# Patient Record
Sex: FEMALE | URBAN - METROPOLITAN AREA
[De-identification: names, ages, dates, MRNs, and addresses within clinical notes are randomized per-mention and may not be internally consistent; named-entity substitution may affect disease eponyms.]

---
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
## 2024-11-13 NOTE — ED PROVIDER NOTE - NSTIMEPROVIDERCAREINITIATE_GEN_ER
Discharge Planning Assessment   Chris     Patient Name: Camelia Gray  MRN: [id number]  Today's Date: 11/13/2024    Admit Date: 11/12/2024    Plan: Return home alone with intermittent caregivers. No home oxygen-currently on 3 liters   Discharge Needs Assessment       Row Name 11/13/24 1306       Living Environment    People in Home alone    Unique Family Situation has daily caregivers but not 24 hr    Current Living Arrangements home    Potentially Unsafe Housing Conditions none    In the past 12 months has the electric, gas, oil, or water company threatened to shut off services in your home? No    Primary Care Provided by self    Provides Primary Care For no one    Family Caregiver if Needed none    Quality of Family Relationships supportive    Able to Return to Prior Arrangements yes    Living Arrangement Comments nephew helps if needed       Resource/Environmental Concerns    Resource/Environmental Concerns none    Transportation Concerns none       Transportation Needs    In the past 12 months, has lack of transportation kept you from medical appointments or from getting medications? no    In the past 12 months, has lack of transportation kept you from meetings, work, or from getting things needed for daily living? No       Food Insecurity    Within the past 12 months, you worried that your food would run out before you got the money to buy more. Never true    Within the past 12 months, the food you bought just didn't last and you didn't have money to get more. Never true       Transition Planning    Patient/Family Anticipates Transition to home;home with help/services    Patient/Family Anticipated Services at Transition none    Transportation Anticipated family or friend will provide       Discharge Needs Assessment    Readmission Within the Last 30 Days no previous admission in last 30 days    Equipment Currently Used at Home walker, rolling;wheelchair    Concerns to be Addressed discharge planning     Anticipated Changes Related to Illness none    Equipment Needed After Discharge none                   Discharge Plan       Row Name 11/13/24 1257       Plan    Plan Return home alone with intermittent caregivers. No home oxygen-currently on 3 liters    Patient/Family in Agreement with Plan yes    Plan Comments Barriers: Cardiology consult and pending heart Echo. CM met with Ms. Gray who is a/o and said she lives alone, does not drive and has hired caregivers daily that help with home duties and transportation. She  has a wheelchair, and rolling walker.  She does not have home oxygen and is on 3 liters at this time. She denies need for continued PT and nursing after discharge and has been at a SNF multiple times, usually [facility].  She denied any financial concerns. She said one of her caregivers or her nephew will provide transportation home at discharge                Demographic Summary       Row Name 11/13/24 130       General Information    Admission Type observation    Arrived From emergency department    Required Notices Provided Observation Status Notice    Referral Source admission list    Reason for Consult discharge planning    Preferred Language English       Contact Information    Permission Granted to Share Info With                    Functional Status       Row Name 11/13/24 5114       Functional Status    Usual Activity Tolerance moderate    Current Activity Tolerance fair       Functional Status, IADL    Medications assistive person    Meal Preparation assistive person    Housekeeping assistive person    Laundry assistive person    Shopping assistive person    IADL Comments has caregiver assistance as needed       Mental Status    General Appearance WDL WDL       Mental Status Summary    Recent Changes in Mental Status/Cognitive Functioning no changes       Employment/    Employment Status retired                          Yamini BUNCH,RN Case  Manager  [facility]  Phone: Desk- [phone] cell- [phone]    14-Jan-2018 00:10